# Patient Record
Sex: MALE | Race: WHITE | NOT HISPANIC OR LATINO
[De-identification: names, ages, dates, MRNs, and addresses within clinical notes are randomized per-mention and may not be internally consistent; named-entity substitution may affect disease eponyms.]

---

## 2024-02-21 ENCOUNTER — NON-APPOINTMENT (OUTPATIENT)
Age: 59
End: 2024-02-21

## 2024-02-21 PROBLEM — Z00.00 ENCOUNTER FOR PREVENTIVE HEALTH EXAMINATION: Status: ACTIVE | Noted: 2024-02-21

## 2024-02-22 ENCOUNTER — APPOINTMENT (OUTPATIENT)
Dept: NEUROSURGERY | Facility: CLINIC | Age: 59
End: 2024-02-22
Payer: SELF-PAY

## 2024-02-22 DIAGNOSIS — G93.89 OTHER SPECIFIED DISORDERS OF BRAIN: ICD-10-CM

## 2024-02-22 PROCEDURE — EDU01: CPT

## 2024-02-22 NOTE — HISTORY OF PRESENT ILLNESS
[de-identified] : 59 y/o male who presents for educational consultation of brain tumor.   2/12/24 MRI brain w/wo with report of left subfrontal region measuring 1 x 1 x 0.5 cm.   Patient endorses was having symptoms of twitches/vestibulations, started in his arms end of Dec 2023 that lasted a few days and resolved and then returned a few days after that to his calves and now generalized to other parts of his body. He underwent metabolic and hormonal workup with PCP and then MRI brain for which was found to have left sub frontal brain mass.  Denies known pattern of twitching or known causes. Bilateral calf twitching ongoing. He feels the twitching, feels off, but not painful.  He is scheduled to see neurologist tomorrow morning for evaluation and further workup.

## 2024-02-22 NOTE — ASSESSMENT
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 The patient reported no symptoms from the brain mass. The provider suggested it could be a benign meningioma and that treatment would only be necessary if it shows progressive growth or causes symptoms. Traditional management involves routine imaging on a gradually extending schedule. The patient was advised to consult a neurologist for his recurring fasciculations, potentially requiring an EMG. Sinusitis was also identified on the MRI, for which antibiotic treatment and consultation with an otolaryngologist was suggested.  Dr. D'Amico conducted educational consultation with patient which included imaging review and overall discussion/education of disease etiology and process.    A total of 45 minutes was spent reviewing the labs, imaging and physical examination of the patient. We discussed the diagnosis, and the plan. The patient's questions were answered. The patient demonstrated an excellent understanding of the plan.

## 2024-02-22 NOTE — REVIEW OF SYSTEMS
[As Noted in HPI] : as noted in HPI [Poor Coordination] : good coordination [Tingling] : no tingling [Numbness] : no numbness [Difficulty Walking] : no difficulty walking

## 2024-02-22 NOTE — PHYSICAL EXAM
[Oriented To Time, Place, And Person] : oriented to person, place, and time [Impaired Insight] : insight and judgment were intact [Affect] : the affect was normal [Sclera] : the sclera and conjunctiva were normal [Memory Recent] : recent memory was not impaired [] : no respiratory distress [Neck Appearance] : the appearance of the neck was normal [Respiration, Rhythm And Depth] : normal respiratory rhythm and effort [Skin Color & Pigmentation] : normal skin color and pigmentation Previously Declined (within the last year)